# Patient Record
Sex: MALE | Race: WHITE | NOT HISPANIC OR LATINO | Employment: PART TIME | URBAN - METROPOLITAN AREA
[De-identification: names, ages, dates, MRNs, and addresses within clinical notes are randomized per-mention and may not be internally consistent; named-entity substitution may affect disease eponyms.]

---

## 2021-05-03 ENCOUNTER — OFFICE VISIT (OUTPATIENT)
Dept: NEUROPSYCHOLOGY | Facility: CLINIC | Age: 51
End: 2021-05-03
Payer: MEDICARE

## 2021-05-03 DIAGNOSIS — Z03.89 NO DIAGNOSIS ON AXIS I: Primary | ICD-10-CM

## 2021-05-03 DIAGNOSIS — R41.3 MEMORY LOSS: Primary | ICD-10-CM

## 2021-05-03 DIAGNOSIS — F25.9 SCHIZOAFFECTIVE DISORDER, UNSPECIFIED TYPE (H): ICD-10-CM

## 2021-05-03 PROCEDURE — 96132 NRPSYC TST EVAL PHYS/QHP 1ST: CPT | Performed by: PSYCHOLOGIST

## 2021-05-03 PROCEDURE — 96133 NRPSYC TST EVAL PHYS/QHP EA: CPT | Performed by: PSYCHOLOGIST

## 2021-05-03 PROCEDURE — 96139 PSYCL/NRPSYC TST TECH EA: CPT | Performed by: PSYCHOLOGIST

## 2021-05-03 PROCEDURE — 96138 PSYCL/NRPSYC TECH 1ST: CPT | Performed by: PSYCHOLOGIST

## 2021-05-03 PROCEDURE — 99207 PR NO CHARGE LOS: CPT | Performed by: PSYCHOLOGIST

## 2021-05-03 PROCEDURE — 90791 PSYCH DIAGNOSTIC EVALUATION: CPT | Performed by: PSYCHOLOGIST
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
## 2021-05-03 NOTE — PROGRESS NOTES
"NAME  Adelfo Tinajero  5/3/21           MRN  [id number]   PROVIDER               74    [facility]           AGE  51    STATION  Outpatient           SEX  Male                 HANDEDNESS  Right                 EDUCATION  14                                    TOMM      DCT      ORIENTATION      Trial 1  50    E-Score  7    Time  0                 Personal Info.           WAIS-IV      Place  2 /2          Digit Span RDS 9    Presidents  5 /6                       WRAT-4 READING      WAIS-IV      TRAIL MAKING TEST       Raw SS %ile G.E.    Raw SSa    Time Errors SSa T   Word Reading 66 108 70 >12.9  Digit Span  26 9   A 27 0 10 49   Sentence Comp. 41 84 14 9.9  Arithmetic  10 7   B 69 0 10 46   Spelling 43 98 45 12.7  Similarities 26 10          Math Comp. 37 87 19 6.5  Vocabulary  41 11          Reading Comp. 192 95 91-99   Block Design 40 10                Matrix Reasoning 17 10                Coding  61 9                                   VIQ (est)  103                 DRE (est)  100                 WMI  89                               HVLT   1   WMS-IV LOGICAL MEMORY YA  GEOVANNI-O COMPLEX FIGURE       Raw T     Raw SSa/%ile     Raw T %ile   Trial 1  6    LM I  13 4   Time to Copy 113\"  >16   Trial 2  8    LM II  6 3   Copy  18  <1   Trial 3  10    Recognition 19 3-9   Short Delay Recall 6 <20 <1   Learning  4          Long Delay Recall 2.5 <20 <1   Total Recall  24 40         Recognition Total 19 40 16   Delayed Recall  8 38                Percent Retention  80% 39                True Positives  11                 False Positives  1                 Disc. Index  10 45                                    BVMT   1   NAB NAMING  F2  ANIMAL FLUENCY        Raw T/%ile   Raw 29 /31    Raw 18      Trial 1  4    z -0.37     SS 9      Trial 2  7    T 41     T 42      Trial 3  10                 Learning  6    COWAT FAS      COMPLEX IDEATIONAL MATERIAL     Total Recall  21 44   Raw 21     Raw 12      Delayed Recall  " 6 34   SS 5     SS 12      Percent Retention  60% <1   T 29     T 58      Recognition Hits  6                 Recognition F.P  0                 Disc. Index  6 >16                                   CLOCK DRAWING       ARMINDA   H  STROOP       Command  NORMAL    Raw 27      Raw Corrected T    Copy  NORMAL    %ile 72     Word 83 91 41                Color 57 61 37                C/W 30 35 40                Intf. -7  41                       WCST (64 CARDS)      CPT-III               Raw T %ile  Variable type Measure T-Score Guideline          # Categories  3  >16  Detectability d' 47 Avg          Total Correct  47    Error Type Omissions 47 Avg          Total Errors  17 41 19   Comissions 43 Low          Perseverative Err.  15 35 6   Perseverations 53 Avg          % Concept Resp.  46 45 30  Reaction Time Statistics HRT 62 Slow          FTMS:  1     HRT SD 49 Avg          LTL  -10.61  11-16   Variability 50 Avg                 HRT Block Change 43 Avg                 HRT TEE Change 41 Low   SULMA

## 2021-05-03 NOTE — NURSING NOTE
Pt was seen for neuropsychological evaluation at the request of Dr. Mauro Parisi for the purposes of diagnostic clarification and treatment planning. 305 minutes of test administration and scoring were provided by this writer. Please see Dr. Micah Bolivar' report for a full interpretation of the findings.    Ray Jones  Psychometrist
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
## 2021-05-03 NOTE — PROGRESS NOTES
Please refer to the morning appointment from today for the full neuropsychological assessment report.    Micah Bolivar, PhD, ABPP

## 2021-05-04 ENCOUNTER — TELEPHONE (OUTPATIENT)
Dept: NEUROPSYCHOLOGY | Facility: CLINIC | Age: 51
End: 2021-05-04

## 2021-05-04 NOTE — TELEPHONE ENCOUNTER
The patient called inquiring about the assessment results.  I called him back and spoke with him.  I let him know that I had called yesterday and left a voicemail message, but he said did not get the voicemail message.  I reminded the patient that this assessment was not an IQ test, but a neuropsychological evaluation.  I told him that we do not calculate IQ scores as part of our evaluation.  I also provided him the results, including no evidence for significant decline in cognitive functioning, but there was variability in attention and processing speed likely related to his psychiatric issues.  I also briefly told him about the academic recommendations and continued mental health interventions.  He asked about neck steps, and I suggested that he follow-up with his referring physician who can help get the recommendations going for him.  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    Micah Bolivar, PhD, ABPP

## 2021-05-04 NOTE — TELEPHONE ENCOUNTER
[facility]    Phone Message    May a detailed message be left on voicemail: yes     Reason for Call: Requesting Results   Name/type of test: IQ test  Date of test: 05/03/21  Was test done at a location other than [facility] (Please fill in the location if not [facility])?: No      Action Taken: Message routed to:  [facility]    Travel Screening: Not Applicable